# Patient Record
Sex: MALE | Race: ASIAN | NOT HISPANIC OR LATINO | Employment: FULL TIME | URBAN - METROPOLITAN AREA
[De-identification: names, ages, dates, MRNs, and addresses within clinical notes are randomized per-mention and may not be internally consistent; named-entity substitution may affect disease eponyms.]

---

## 2017-03-23 ENCOUNTER — ALLSCRIPTS OFFICE VISIT (OUTPATIENT)
Dept: OTHER | Facility: OTHER | Age: 42
End: 2017-03-23

## 2017-04-07 ENCOUNTER — ALLSCRIPTS OFFICE VISIT (OUTPATIENT)
Dept: OTHER | Facility: OTHER | Age: 42
End: 2017-04-07

## 2017-05-12 ENCOUNTER — ALLSCRIPTS OFFICE VISIT (OUTPATIENT)
Dept: OTHER | Facility: OTHER | Age: 42
End: 2017-05-12

## 2018-01-09 NOTE — MISCELLANEOUS
Provider Comments  Provider Comments:   PATIENT NO SHOWED FOR 4-7-2017 APPT  Signatures   Electronically signed by : Hemalatha Lozano MA;  Apr 7 2017 11:01AM EST                       (Author)

## 2018-01-09 NOTE — RESULT NOTES
Message   Has type 2 diabetes  The antibody testing is negative for type 1 diabetes  Verified Results  (1) IA2 AUTOANTIBODIES [id number] 04:34PM [facility]: [id number]     Test Name Result Flag Reference   IA2 AUTOANTIBODIES <1 0 U/mL     Reference Range:  <1 0        Negative  > or = 1 0  Positive    Performed at:  [facility]  [address] [place], [place]  [de-identified]  : Breann Sheehan MD, Phone:  [phone]     (1) INSULIN ANTIBODIES [id number] 04:34PM [facility] [place] Order Number: [id number]     Test Name Result Flag Reference   INSULIN ANTIBODIES <5 0 uU/mL     This test is also known as insulin autoantibody or IAA    Reference Range:  <5 0        Negative  > or = 5 0  Positive    Performed at:  [facility]  [address] [place], [place]  [de-identified]  : Breann Sheehan MD, Phone:  [phone]     (1) GLUTAMIC ACID DECARBOXYLASE [id number] 04:32PM Magan Agosto     Test Name Result Flag Reference   GLUTAMIC ACID DECARBOXYLASE <5 0 U/mL  0 0 - 5 0   Performed at:  [address]  [id number]  : Ismael Sinclair MD, Phone:  [phone]

## 2018-01-11 NOTE — RESULT NOTES
Message   Day hemoglobin A1c has improved  The cholesterol level is high  Add atorvastatin 20 mg at bedtime  Check CMP in 10 days  Verified Results  (1) MICROALBUMIN CREATININE RATIO, RANDOM URINE [id number] 04:32PM Sander Ford     Test Name Result Flag Reference   MICROALBUMIN/ CREAT R <11 mg/g creatinine  0-30   MICROALBUMIN,URINE <5 0 mg/L  0 0-20 0   CREATININE URINE 45 3 mg/dL       (1) COMPREHENSIVE METABOLIC PANEL [id number] 72:58CX Sander Ford     Test Name Result Flag Reference   GLUCOSE,RANDM 274 mg/dL H    If the patient is fasting, the ADA then defines impaired fasting glucose as > 100 mg/dL and diabetes as > or equal to 123 mg/dL  SODIUM 136 mmol/L  136-145   POTASSIUM 4 2 mmol/L  3 5-5 3   CHLORIDE 103 mmol/L  100-108   CARBON DIOXIDE 25 mmol/L  21-32   ANION GAP (CALC) 8 mmol/L  4-13   BLOOD UREA NITROGEN 12 mg/dL  5-25   CREATININE 0 88 mg/dL  0 60-1 30   Standardized to IDMS reference method   CALCIUM 9 1 mg/dL  8 3-10 1   BILI, TOTAL 0 35 mg/dL  0 20-1 00   ALK PHOSPHATAS 98 U/L     ALT (SGPT) 42 U/L  12-78   AST(SGOT) 16 U/L  5-45   ALBUMIN 4 2 g/dL  3 5-5 0   TOTAL PROTEIN 8 0 g/dL  6 4-8 2   eGFR Non-African American      >60 0 ml/min/1 73sq [facility] [employer] Disease Education Program recommendations are as follows:  GFR calculation is accurate only with a steady state creatinine  Chronic Kidney disease less than 60 ml/min/1 73 sq  meters  Kidney failure less than 15 ml/min/1 73 sq  meters       (1) LIPID PANEL FASTING W DIRECT LDL REFLEX [id number] 04:32PM Franco Prince     Test Name Result Flag Reference   CHOLESTEROL 252 mg/dL H    LDL CHOLESTEROL CALCULATED (Report)  0-100   Calculated LDL invalid, triglycerides >400 mg/dl    Triglyceride:       Normal       <150 mg/dl     Borderline High  150-199 mg/dl     High        200-499 mg/dl     Very High     >499 mg/dl  Cholesterol:       Desirable    <200 mg/dl    Borderline High 200-239 mg/dl    High       >239 mg/dl  HDL Cholesterol:      High  >59 mg/dL    Low   <41 mg/dL  LDL CALCULATED:    This screening LDL is a calculated result  It does not have the accuracy of the Direct Measured LDL in the monitoring of patients with hyperlipidemia and/or statin therapy  Direct Measure LDL (OOI337) must be ordered separately in these patients  Calculated LDL invalid, triglycerides >400 mg/dl    Triglyceride:         Normal              <150 mg/dl       Borderline High    150-199 mg/dl       High               200-499 mg/dl       Very High          >499 mg/dl  Cholesterol:         Desirable        <200 mg/dl      Borderline High  200-239 mg/dl      High             >239 mg/dl  HDL Cholesterol:        High    >59 mg/dL      Low     <41 mg/dL  LDL CALCULATED:    This screening LDL is a calculated result  It does not have the accuracy of the Direct Measured LDL in the monitoring of patients with hyperlipidemia and/or statin therapy  Direct Measure LDL (UGX606) must be ordered separately in these patients  TRIGLYCERIDES 673 mg/dL H <=150   Specimen collection should occur prior to N-Acetylcysteine or Metamizole administration due to the potential for falsely depressed results  HDL,DIRECT 30 mg/dL L 40-60   Specimen collection should occur prior to Metamizole administration due to the potential for falsely depressed results  (1) LIPID PANEL FASTING W DIRECT LDL REFLEX [id number] 04:32PM Savanah Mcknight     Test Name Result Flag Reference   LDL, DIRECT 155 mg/dl H 0-100   LDL Cholesterol:        Optimal          <100 mg/dl        Near Optimal     100-129 mg/dl        Above Optimal          Borderline High   130-159 mg/dl          High              160-189 mg/dl          Very High        >189 mg/dl     (1) HEMOGLOBIN A1C [id number] 04:32PM Sander Ford     Test Name Result Flag Reference   HEMOGLOBIN A1C 8 8 % H 4 2-6 3   EST  AVG   GLUCOSE 206 mg/dl

## 2018-01-13 VITALS
SYSTOLIC BLOOD PRESSURE: 128 MMHG | HEIGHT: 71 IN | WEIGHT: 143.5 LBS | DIASTOLIC BLOOD PRESSURE: 82 MMHG | BODY MASS INDEX: 20.09 KG/M2 | HEART RATE: 70 BPM

## 2018-01-15 NOTE — MISCELLANEOUS
Provider Comments  Provider Comments:   PATIENT NO SHOWED FOR 3- APPT  Signatures   Electronically signed by :  Cristina Andrews; Mar 23 2017  8:40AM EST                       (Author)